# Patient Record
Sex: FEMALE | Race: WHITE
[De-identification: names, ages, dates, MRNs, and addresses within clinical notes are randomized per-mention and may not be internally consistent; named-entity substitution may affect disease eponyms.]

---

## 2019-03-24 ENCOUNTER — HOSPITAL ENCOUNTER (EMERGENCY)
Dept: HOSPITAL 74 - FER | Age: 74
Discharge: HOME | End: 2019-03-24
Payer: COMMERCIAL

## 2019-03-24 VITALS — DIASTOLIC BLOOD PRESSURE: 84 MMHG | SYSTOLIC BLOOD PRESSURE: 165 MMHG | HEART RATE: 80 BPM | TEMPERATURE: 97.5 F

## 2019-03-24 VITALS — BODY MASS INDEX: 19.2 KG/M2

## 2019-03-24 DIAGNOSIS — E03.9: ICD-10-CM

## 2019-03-24 DIAGNOSIS — M79.672: Primary | ICD-10-CM

## 2019-03-24 NOTE — PDOC
Attending Attestation





- Resident


Resident Name: Adilene Mata





- ED Attending Attestation


I have performed the following: I have examined & evaluated the patient, The 

case was reviewed & discussed with the resident, I agree w/resident's findings 

& plan





- HPI


HPI: 





03/24/19 10:39


72 y/o female with left foot injury almost one week ago while hanging clothes 

stepped off incorrectly. Painful and bruised left foot. Using a crutch to 

ambulate. Took OTC medication for the pain. No fever or chills.





- Physicial Exam


PE: 





03/24/19 10:41


VS stable


HEENT: unremarkable


Heart: RRR w/o murmur


Lungs: CTA b/l no wheezes, rhonci or rales


EXT: Left foot plantar surface and medical aspect with ecchymosis and mild 

swelling and tenderness, full ROM of left ankle, pulses 2+/4 b/l in LE


Neuro: grossly intact with no focal deficits noted


03/24/19 10:52








- Medical Decision Making





03/24/19 11:25


x-ray left foot: no fracture





Case discussed with Dr. Mata and patient. In agreement with plan. Will give ortho 

shoe and cane.  Elevate, ice, Motrin


If worsen will need Orthopedics, CT foot, and ortho boot





Dx: left foot contusion

## 2019-03-24 NOTE — PDOC
History of Present Illness





- General


Chief Complaint: Injury


Stated Complaint: LEFT FOOT INJURY


Time Seen by Provider: 03/24/19 10:35





- History of Present Illness


Initial Comments: 


74yo F with history of hypothyroidism presenting with inability to bear weight 

on her left foot. Patient states she suffered a fall on Tuesday (5 days prior 

to arrival). Denies loss of consciousness, naseua, or vomiting. She 

inadvertently slid from standing position on a chair and landed on her left 

foot. Patient was unable to bear weight on the foot thereafter and ordered 

crutches online which arrived the next day. Patient endorses no pain except 

when she tries to walk on that foot. She has complete range of motion of her 

ankle. No bleeding or breaks in skin, but endorses bruising. No fevers, chills, 

chest pain, or shortness of breath. 





PCP: A physician in the city, does not remember the name








Past History





- Past Medical History


Allergies/Adverse Reactions: 


 Allergies











Allergy/AdvReac Type Severity Reaction Status Date / Time


 


No Known Allergies Allergy   Unverified 03/24/19 10:36











Home Medications: 


Ambulatory Orders





Levothyroxine [Synthroid -] 50 mcg PO DAILY 03/24/19 








COPD: No


Thyroid Disease: Yes





- Suicide/Smoking/Psychosocial Hx


Smoking History: Never smoked


Information on smoking cessation initiated: No


Hx Alcohol Use: No


Drug/Substance Use Hx: No





**Review of Systems





- Review of Systems


Comments:: 


Constitutional: no fever, no chills


HEENT: no throat pain, no dysphagia


Cardiovascular: no chest pain, no palpitations


Respiratory: no cough, no shortness of breath


Gastrointestinal: no abdominal pain, no nausea


Genitourinary: no dysuria, no frequency


Musculoskeletal: no R. foot pain, +L. foot pain


Skin: no itching, +bruising


Neurologic: no headache, no weakness














*Physical Exam





- Vital Signs


 Last Vital Signs











Temp Pulse Resp BP Pulse Ox


 


 97.5 F L  80   16   165/84   98 


 


 03/24/19 10:35  03/24/19 10:35  03/24/19 10:35  03/24/19 10:35  03/24/19 10:35














- Physical Exam


Comments: 


General: Awake, alert, and fully oriented, in no acute distress


Head: No signs of trauma


Eyes: EOMI, sclera anicteric


ENT: Moist mucus membranes


Neck: Normal ROM, supple


Lungs: Lungs clear, Normal breath sounds


Cardio: Regular rhythm, S1 and S2 present


Abdomen: Soft, nontender


Extremities: Normal range of motion, Distal pulses present


L. foot: Equal strength with R. foot, normal sensation, pain upon palpation of 

the metatarsals; ecchymosis overlying arch of foot medially


SKIN: Warm, Dry, normal turgor


Neurologic: Cranial nerves II through XII grossly intact. Normal speech











Moderate Sedation





- Procedure Monitoring


Vital Signs: 


Procedure Monitoring Vital Signs











Temperature  97.5 F L  03/24/19 10:35


 


Pulse Rate  80   03/24/19 10:35


 


Respiratory Rate  16   03/24/19 10:35


 


Blood Pressure  165/84   03/24/19 10:35


 


O2 Sat by Pulse Oximetry (%)  98   03/24/19 10:35











ED Treatment Course





- RADIOLOGY


Radiology Studies Ordered: 














 Category Date Time Status


 


 FOOT-LEFT [RAD] Stat Radiology  03/24/19 10:47 Ordered














Medical Decision Making





- Medical Decision Making


74yo F with history of hypothyroidism presenting with inability to bear weight 

on her left foot. Patient states she suffered a fall on Tuesday (5 days prior 

to arrival). 


DDX including but not limited to fracture, break, ligament strain, soft tissue 

injury


Radiographs of L. foot


Patient declined pain medication


03/24/19 10:52





L. foot radiograph without acute pathology: 


3 views of the left foot have been submitted. There are no prior studies for 

comparison. There is loss of bone density. There is bunion formation by the 

first MTP joint, partially flexed toes with other arthritic changes but no sign 

of fracture or subluxation and no sign of blastic or lytic changes. Swelling, 

foreign body or soft tissue air is not seen. There is no sign of calcaneal 

spurring. There is a growth arrest line in the distal tibia. Impression: No 

acute pathology. If symptoms persist or there is decreased range of motion then 

further imaging and orthopedic consultation may be of help. 





Patient fitted with orthopedic boot


Referral to ortho


Discharged


03/24/19 12:34








*DC/Admit/Observation/Transfer


Diagnosis at time of Disposition: 


 Left foot pain








- Discharge Dispostion


Disposition: HOME


Condition at time of disposition: Stable





- Referrals


Referrals: 


Eduardo Venegas MD [Staff Physician] - 





- Patient Instructions


Printed Discharge Instructions:  [facility] for Foot Pain


Additional Instructions: 


You came into the ED for left foot pain. We took xrays which did not show a 

fracture. 





Wear the orthopedic boot to keep weight off the area. 





You can take also over-the-counter tylenol for pain. Follow the instructions on 

the medication bottle





We have referred you to a specialist. Follow-up with the orthopedist if your 

pain does not improve in one week. Call and make an appointment. 





Immediate medical attention is required if you have: high fevers, persistent 

vomiting, chest pain, or any new or concerning symptoms.  If you think you are 

having an emergency, call for emergency medical services or present to the 

emergency department right away. 








- Post Discharge Activity

## 2021-08-08 ENCOUNTER — TRANSCRIPTION ENCOUNTER (OUTPATIENT)
Age: 76
End: 2021-08-08